# Patient Record
Sex: FEMALE | Employment: OTHER | URBAN - METROPOLITAN AREA
[De-identification: names, ages, dates, MRNs, and addresses within clinical notes are randomized per-mention and may not be internally consistent; named-entity substitution may affect disease eponyms.]

---

## 2024-04-29 ENCOUNTER — OFFICE VISIT (OUTPATIENT)
Dept: FAMILY MEDICINE CLINIC | Age: 74
End: 2024-04-29

## 2024-04-29 VITALS
SYSTOLIC BLOOD PRESSURE: 122 MMHG | BODY MASS INDEX: 32.17 KG/M2 | WEIGHT: 188.4 LBS | OXYGEN SATURATION: 94 % | HEIGHT: 64 IN | HEART RATE: 93 BPM | DIASTOLIC BLOOD PRESSURE: 78 MMHG | TEMPERATURE: 97.9 F

## 2024-04-29 DIAGNOSIS — G47.9 SLEEPING DIFFICULTY: ICD-10-CM

## 2024-04-29 DIAGNOSIS — Z23 NEED FOR SHINGLES VACCINE: ICD-10-CM

## 2024-04-29 DIAGNOSIS — Z91.81 AT HIGH RISK FOR FALLS: ICD-10-CM

## 2024-04-29 DIAGNOSIS — F41.1 GENERALIZED ANXIETY DISORDER: ICD-10-CM

## 2024-04-29 DIAGNOSIS — I87.2 EDEMA OF BOTH LOWER EXTREMITIES DUE TO PERIPHERAL VENOUS INSUFFICIENCY: ICD-10-CM

## 2024-04-29 DIAGNOSIS — Z12.31 ENCOUNTER FOR SCREENING MAMMOGRAM FOR MALIGNANT NEOPLASM OF BREAST: ICD-10-CM

## 2024-04-29 DIAGNOSIS — Z13.0 SCREENING, ANEMIA, DEFICIENCY, IRON: ICD-10-CM

## 2024-04-29 DIAGNOSIS — E55.9 VITAMIN D DEFICIENCY: ICD-10-CM

## 2024-04-29 DIAGNOSIS — Z87.310 HISTORY OF HEALED OSTEOPOROSIS FRACTURE: ICD-10-CM

## 2024-04-29 DIAGNOSIS — F33.1 MODERATE EPISODE OF RECURRENT MAJOR DEPRESSIVE DISORDER (HCC): ICD-10-CM

## 2024-04-29 DIAGNOSIS — K21.9 GASTROESOPHAGEAL REFLUX DISEASE, UNSPECIFIED WHETHER ESOPHAGITIS PRESENT: ICD-10-CM

## 2024-04-29 DIAGNOSIS — K22.70 BARRETT'S ESOPHAGUS WITHOUT DYSPLASIA: ICD-10-CM

## 2024-04-29 DIAGNOSIS — Z12.11 COLON CANCER SCREENING: ICD-10-CM

## 2024-04-29 DIAGNOSIS — E78.00 HYPERCHOLESTEREMIA: ICD-10-CM

## 2024-04-29 DIAGNOSIS — G43.809 OTHER MIGRAINE WITHOUT STATUS MIGRAINOSUS, NOT INTRACTABLE: ICD-10-CM

## 2024-04-29 DIAGNOSIS — Z13.220 SCREENING, LIPID: ICD-10-CM

## 2024-04-29 DIAGNOSIS — Z13.29 SCREENING FOR THYROID DISORDER: ICD-10-CM

## 2024-04-29 DIAGNOSIS — G60.0 CHARCOT-MARIE-TOOTH DISEASE: ICD-10-CM

## 2024-04-29 DIAGNOSIS — I10 PRIMARY HYPERTENSION: Primary | ICD-10-CM

## 2024-04-29 DIAGNOSIS — M81.0 AGE-RELATED OSTEOPOROSIS WITHOUT CURRENT PATHOLOGICAL FRACTURE: ICD-10-CM

## 2024-04-29 PROBLEM — G43.909 MIGRAINE: Status: ACTIVE | Noted: 2024-04-29

## 2024-04-29 RX ORDER — AMLODIPINE BESYLATE 5 MG/1
5 TABLET ORAL DAILY
COMMUNITY

## 2024-04-29 RX ORDER — AMLODIPINE BESYLATE 5 MG/1
5 TABLET ORAL DAILY
Qty: 90 TABLET | Refills: 1 | Status: CANCELLED | OUTPATIENT
Start: 2024-04-29

## 2024-04-29 RX ORDER — ACETAMINOPHEN 500 MG
1000 TABLET ORAL EVERY 8 HOURS
COMMUNITY
Start: 2022-04-28

## 2024-04-29 RX ORDER — SERTRALINE HYDROCHLORIDE 100 MG/1
100 TABLET, FILM COATED ORAL DAILY
COMMUNITY
End: 2024-04-29 | Stop reason: SDUPTHER

## 2024-04-29 RX ORDER — RIMEGEPANT SULFATE 75 MG/75MG
75 TABLET, ORALLY DISINTEGRATING ORAL DAILY PRN
Qty: 10 TABLET | Refills: 3 | Status: SHIPPED | OUTPATIENT
Start: 2024-04-29

## 2024-04-29 RX ORDER — METOPROLOL TARTRATE 50 MG/1
50 TABLET, FILM COATED ORAL 2 TIMES DAILY
COMMUNITY

## 2024-04-29 RX ORDER — LANOLIN ALCOHOL/MO/W.PET/CERES
1000 CREAM (GRAM) TOPICAL DAILY
COMMUNITY

## 2024-04-29 RX ORDER — SERTRALINE HYDROCHLORIDE 100 MG/1
100 TABLET, FILM COATED ORAL DAILY
Qty: 90 TABLET | Refills: 1 | Status: SHIPPED | OUTPATIENT
Start: 2024-04-29

## 2024-04-29 RX ORDER — AMITRIPTYLINE HYDROCHLORIDE 25 MG/1
50 TABLET, FILM COATED ORAL NIGHTLY
COMMUNITY

## 2024-04-29 RX ORDER — SUMATRIPTAN 50 MG/1
50 TABLET, FILM COATED ORAL
COMMUNITY
Start: 2018-09-28

## 2024-04-29 RX ORDER — OMEPRAZOLE 10 MG/1
40 CAPSULE, DELAYED RELEASE ORAL DAILY
COMMUNITY
End: 2024-04-29 | Stop reason: SDUPTHER

## 2024-04-29 RX ORDER — ROSUVASTATIN CALCIUM 5 MG/1
5 TABLET, COATED ORAL DAILY
Qty: 90 TABLET | Refills: 1 | Status: CANCELLED | OUTPATIENT
Start: 2024-04-29

## 2024-04-29 RX ORDER — BUTALBITAL, ACETAMINOPHEN, CAFFEINE AND CODEINE PHOSPHATE 50; 325; 40; 30 MG/1; MG/1; MG/1; MG/1
1 CAPSULE ORAL EVERY 6 HOURS PRN
COMMUNITY

## 2024-04-29 RX ORDER — METOPROLOL TARTRATE 50 MG/1
50 TABLET, FILM COATED ORAL 2 TIMES DAILY
Qty: 180 TABLET | Refills: 1 | Status: CANCELLED | OUTPATIENT
Start: 2024-04-29

## 2024-04-29 RX ORDER — OMEPRAZOLE 40 MG/1
40 CAPSULE, DELAYED RELEASE ORAL DAILY
Qty: 90 CAPSULE | Refills: 1 | Status: SHIPPED | OUTPATIENT
Start: 2024-04-29

## 2024-04-29 RX ORDER — BUTALBITAL, ACETAMINOPHEN AND CAFFEINE 50; 325; 40 MG/1; MG/1; MG/1
1 TABLET ORAL EVERY 4 HOURS PRN
Qty: 30 TABLET | Refills: 1 | Status: SHIPPED | OUTPATIENT
Start: 2024-04-29

## 2024-04-29 RX ORDER — ZOSTER VACCINE RECOMBINANT, ADJUVANTED 50 MCG/0.5
0.5 KIT INTRAMUSCULAR SEE ADMIN INSTRUCTIONS
Qty: 0.5 ML | Refills: 0 | Status: SHIPPED | OUTPATIENT
Start: 2024-04-29 | End: 2024-04-30

## 2024-04-29 RX ORDER — ROSUVASTATIN CALCIUM 5 MG/1
5 TABLET, COATED ORAL DAILY
COMMUNITY

## 2024-04-29 RX ORDER — CETIRIZINE HYDROCHLORIDE 10 MG/1
10 TABLET ORAL DAILY PRN
COMMUNITY

## 2024-04-29 RX ORDER — ACETAMINOPHEN 160 MG
2000 TABLET,DISINTEGRATING ORAL DAILY
COMMUNITY

## 2024-04-29 RX ORDER — BUTALBITAL, ACETAMINOPHEN, CAFFEINE AND CODEINE PHOSPHATE 50; 325; 40; 30 MG/1; MG/1; MG/1; MG/1
1 CAPSULE ORAL EVERY 6 HOURS PRN
Status: CANCELLED | OUTPATIENT
Start: 2024-04-29

## 2024-04-29 SDOH — ECONOMIC STABILITY: FOOD INSECURITY: WITHIN THE PAST 12 MONTHS, YOU WORRIED THAT YOUR FOOD WOULD RUN OUT BEFORE YOU GOT MONEY TO BUY MORE.: NEVER TRUE

## 2024-04-29 SDOH — ECONOMIC STABILITY: INCOME INSECURITY: HOW HARD IS IT FOR YOU TO PAY FOR THE VERY BASICS LIKE FOOD, HOUSING, MEDICAL CARE, AND HEATING?: NOT VERY HARD

## 2024-04-29 SDOH — ECONOMIC STABILITY: FOOD INSECURITY: WITHIN THE PAST 12 MONTHS, THE FOOD YOU BOUGHT JUST DIDN'T LAST AND YOU DIDN'T HAVE MONEY TO GET MORE.: NEVER TRUE

## 2024-04-29 SDOH — ECONOMIC STABILITY: HOUSING INSECURITY
IN THE LAST 12 MONTHS, WAS THERE A TIME WHEN YOU DID NOT HAVE A STEADY PLACE TO SLEEP OR SLEPT IN A SHELTER (INCLUDING NOW)?: NO

## 2024-04-29 ASSESSMENT — PATIENT HEALTH QUESTIONNAIRE - PHQ9
SUM OF ALL RESPONSES TO PHQ QUESTIONS 1-9: 1
SUM OF ALL RESPONSES TO PHQ QUESTIONS 1-9: 1
SUM OF ALL RESPONSES TO PHQ9 QUESTIONS 1 & 2: 1
2. FEELING DOWN, DEPRESSED OR HOPELESS: SEVERAL DAYS
1. LITTLE INTEREST OR PLEASURE IN DOING THINGS: NOT AT ALL
SUM OF ALL RESPONSES TO PHQ QUESTIONS 1-9: 1
SUM OF ALL RESPONSES TO PHQ QUESTIONS 1-9: 1

## 2024-04-29 NOTE — PATIENT INSTRUCTIONS
Your labs have been ordered today as fasting labs - this means you will need to fast overnight for at least 8 hours before you come in to get the blood drawn. You may have water, black tea or coffee without sugar or creamer prior to coming in for labs.   Labs due 5/9/24 or later   Your lab results will be released to your [employer] account as they are resulted by the lab. I will send you a message regarding your results once I have had a chance to review them, usually within a couple of days, so if you have not heard from me it means I'm either waiting for some results, or that I have not had a moment to review the results.

## 2024-04-29 NOTE — PROGRESS NOTES
[facility]  [facility]  [address] [place] [id number]  Dept: [phone]  Dept Fax: [phone]      Deandra Lou is a 73 y.o. female who presents today for hermedical conditions/complaints as noted below.  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 Primary hypertension  -     Comprehensive Metabolic Panel; Future  2. Hypercholesteremia  -     Comprehensive Metabolic Panel; Future  -     Lipid, Fasting; Future  3. Generalized anxiety disorder  -     sertraline (ZOLOFT) 100 MG tablet; Take 1 tablet by mouth daily, Disp-90 tablet, R-1Normal  4. Moderate episode of recurrent major depressive disorder (HCC)  -     sertraline (ZOLOFT) 100 MG tablet; Take 1 tablet by mouth daily, Disp-90 tablet, R-1Normal  5. Gastroesophageal reflux disease, unspecified whether esophagitis present  -     omeprazole (PRILOSEC) 40 MG delayed release capsule; Take 1 capsule by mouth daily, Disp-90 capsule, R-1Normal  6. Other migraine without status migrainosus, not intractable  -     rimegepant sulfate (NURTEC) 75 MG TBDP; Take 75 mg by mouth daily as needed (migraine), Disp-10 tablet, R-3Normal  -     butalbital-acetaminophen-caffeine (FIORICET, ESGIC) -40 MG per tablet; Take 1 tablet by mouth every 4 hours as needed for Migraine, Disp-30 tablet, R-1Normal  7. Sleeping difficulty  8. Encounter for screening mammogram for malignant neoplasm of breast  -     PAT DIGITAL SCREEN W OR WO CAD BILATERAL; Future  9. Need for shingles vaccine  -     zoster recombinant adjuvanted vaccine (SHINGRIX) 50 MCG/0.5ML SUSR injection; Inject 0.5 mLs into the muscle See Admin Instructions for 1 day, Disp-0.5 mL, R-0Print  10. Colon cancer

## 2024-05-07 ENCOUNTER — TELEPHONE (OUTPATIENT)
Dept: NEUROLOGY | Age: 74
End: 2024-05-07

## 2024-05-07 NOTE — TELEPHONE ENCOUNTER
05 07 2024 called the patient times 2 (04 30 2024 and 05 07 2024 at  [phone]) to schedule new patient appointment with one of our providers, voicemail was full both times, no response.  I mailed the patient a letter asking them to call the office back to schedule this appointment.  KS

## 2024-05-14 ENCOUNTER — TELEPHONE (OUTPATIENT)
Dept: FAMILY MEDICINE CLINIC | Age: 74
End: 2024-05-14

## 2024-05-14 NOTE — TELEPHONE ENCOUNTER
----- Message from Dede Sebastian Jalenradharenata sent at 5/14/2024  2:38 PM EDT -----  Regarding: ECC Message to Provider  ECC Message to Provider    Relationship to Patient: Self     Additional Information patient received an order for her colonoscopy and pt wants to provide the FAX number: [phone]  --------------------------------------------------------------------------------------------------------------------------    Call Back Information: OK to leave message on voicemail  Preferred Call Back Number: Phone [phone]

## 2024-05-28 ENCOUNTER — HOSPITAL ENCOUNTER (OUTPATIENT)
Dept: MAMMOGRAPHY | Age: 74
Discharge: HOME | End: 2024-05-30
Payer: COMMERCIAL

## 2024-05-28 VITALS — WEIGHT: 188 LBS | HEIGHT: 64 IN | BODY MASS INDEX: 32.1 KG/M2

## 2024-05-28 DIAGNOSIS — Z12.31 ENCOUNTER FOR SCREENING MAMMOGRAM FOR MALIGNANT NEOPLASM OF BREAST: ICD-10-CM

## 2024-05-28 PROCEDURE — 77063 BREAST TOMOSYNTHESIS BI: CPT

## 2024-06-10 ENCOUNTER — PATIENT MESSAGE (OUTPATIENT)
Dept: FAMILY MEDICINE CLINIC | Age: 74
End: 2024-06-10

## 2024-06-11 NOTE — TELEPHONE ENCOUNTER
From: Deandra Lou  To: Dr. Sunshine Perkins  Sent: 6/10/2024 5:04 PM EDT  Subject: Renewal of handicap sticker    Hi Dr. Gonsalez,    I need to get a renewal prescription for my handicap sticker that’s due in August 2024 to [facility].

## 2024-06-18 ENCOUNTER — HOSPITAL ENCOUNTER (OUTPATIENT)
Dept: ULTRASOUND IMAGING | Age: 74
Discharge: HOME | End: 2024-06-20
Payer: COMMERCIAL

## 2024-06-18 ENCOUNTER — HOSPITAL ENCOUNTER (OUTPATIENT)
Dept: MAMMOGRAPHY | Age: 74
Discharge: HOME | End: 2024-06-20
Payer: COMMERCIAL

## 2024-06-18 DIAGNOSIS — R92.8 ABNORMAL MAMMOGRAM: ICD-10-CM

## 2024-06-18 PROCEDURE — 76642 ULTRASOUND BREAST LIMITED: CPT

## 2024-06-18 PROCEDURE — G0279 TOMOSYNTHESIS, MAMMO: HCPCS

## 2024-07-02 ENCOUNTER — TRANSCRIBE ORDERS (OUTPATIENT)
Dept: ADMINISTRATIVE | Age: 74
End: 2024-07-02

## 2024-07-02 DIAGNOSIS — Z53.8 FAILED ATTEMPTED SURGICAL PROCEDURE: Primary | ICD-10-CM

## 2024-07-03 ENCOUNTER — HOSPITAL ENCOUNTER (OUTPATIENT)
Dept: GENERAL RADIOLOGY | Age: 74
Discharge: HOME | End: 2024-07-05
Attending: INTERNAL MEDICINE
Payer: COMMERCIAL

## 2024-07-03 DIAGNOSIS — Z53.8 FAILED ATTEMPTED SURGICAL PROCEDURE: ICD-10-CM

## 2024-07-03 PROCEDURE — 2500000003 HC RX 250 WO HCPCS: Performed by: INTERNAL MEDICINE

## 2024-07-03 PROCEDURE — 74280 X-RAY XM COLON 2CNTRST STD: CPT

## 2024-07-03 RX ADMIN — BARIUM SULFATE 745 ML: 1.05 SUSPENSION ORAL; RECTAL at 13:50

## 2024-07-30 RX ORDER — METOPROLOL TARTRATE 50 MG/1
50 TABLET, FILM COATED ORAL 2 TIMES DAILY
Qty: 60 TABLET | Refills: 1 | Status: SHIPPED | OUTPATIENT
Start: 2024-07-30

## 2024-09-13 DIAGNOSIS — G43.809 OTHER MIGRAINE WITHOUT STATUS MIGRAINOSUS, NOT INTRACTABLE: ICD-10-CM

## 2024-09-16 RX ORDER — BUTALBITAL, ACETAMINOPHEN AND CAFFEINE 50; 325; 40 MG/1; MG/1; MG/1
1 TABLET ORAL EVERY 4 HOURS PRN
Qty: 30 TABLET | Refills: 0 | Status: SHIPPED | OUTPATIENT
Start: 2024-09-16 | End: 2024-09-18 | Stop reason: SDUPTHER

## 2024-09-18 ENCOUNTER — OFFICE VISIT (OUTPATIENT)
Dept: FAMILY MEDICINE CLINIC | Age: 74
End: 2024-09-18
Payer: COMMERCIAL

## 2024-09-18 VITALS — BODY MASS INDEX: 32.79 KG/M2 | TEMPERATURE: 97.4 F | WEIGHT: 191 LBS

## 2024-09-18 DIAGNOSIS — G43.809 OTHER MIGRAINE WITHOUT STATUS MIGRAINOSUS, NOT INTRACTABLE: ICD-10-CM

## 2024-09-18 DIAGNOSIS — I10 PRIMARY HYPERTENSION: ICD-10-CM

## 2024-09-18 DIAGNOSIS — F33.1 MODERATE EPISODE OF RECURRENT MAJOR DEPRESSIVE DISORDER (HCC): ICD-10-CM

## 2024-09-18 DIAGNOSIS — K21.9 GASTROESOPHAGEAL REFLUX DISEASE, UNSPECIFIED WHETHER ESOPHAGITIS PRESENT: ICD-10-CM

## 2024-09-18 DIAGNOSIS — S89.92XA LEFT KNEE INJURY, INITIAL ENCOUNTER: Primary | ICD-10-CM

## 2024-09-18 DIAGNOSIS — F41.1 GENERALIZED ANXIETY DISORDER: ICD-10-CM

## 2024-09-18 PROCEDURE — 1123F ACP DISCUSS/DSCN MKR DOCD: CPT | Performed by: INTERNAL MEDICINE

## 2024-09-18 PROCEDURE — 99214 OFFICE O/P EST MOD 30 MIN: CPT | Performed by: INTERNAL MEDICINE

## 2024-09-18 RX ORDER — BUTALBITAL, ACETAMINOPHEN AND CAFFEINE 50; 325; 40 MG/1; MG/1; MG/1
1 TABLET ORAL EVERY 4 HOURS PRN
Qty: 60 TABLET | Refills: 1 | Status: SHIPPED | OUTPATIENT
Start: 2024-09-18

## 2024-09-18 RX ORDER — CELECOXIB 100 MG/1
100 CAPSULE ORAL DAILY
Qty: 30 CAPSULE | Refills: 3 | Status: SHIPPED | OUTPATIENT
Start: 2024-09-18

## 2024-09-23 ASSESSMENT — ENCOUNTER SYMPTOMS
COUGH: 0
ANAL BLEEDING: 0
CONSTIPATION: 0
BLOOD IN STOOL: 0
SHORTNESS OF BREATH: 0
DIARRHEA: 0
ABDOMINAL PAIN: 0
NAUSEA: 0
CHOKING: 0
WHEEZING: 0
CHEST TIGHTNESS: 0
VOMITING: 0

## 2024-09-23 ASSESSMENT — VISUAL ACUITY: OU: 1

## 2024-09-29 DIAGNOSIS — F41.1 GENERALIZED ANXIETY DISORDER: ICD-10-CM

## 2024-09-29 DIAGNOSIS — F33.1 MODERATE EPISODE OF RECURRENT MAJOR DEPRESSIVE DISORDER (HCC): ICD-10-CM

## 2024-09-29 DIAGNOSIS — K21.9 GASTROESOPHAGEAL REFLUX DISEASE, UNSPECIFIED WHETHER ESOPHAGITIS PRESENT: ICD-10-CM

## 2024-10-01 RX ORDER — METOPROLOL TARTRATE 50 MG
50 TABLET ORAL 2 TIMES DAILY
Qty: 120 TABLET | Refills: 0 | Status: SHIPPED | OUTPATIENT
Start: 2024-10-01

## 2024-10-01 RX ORDER — SERTRALINE HYDROCHLORIDE 100 MG/1
100 TABLET, FILM COATED ORAL DAILY
Qty: 90 TABLET | Refills: 0 | Status: SHIPPED | OUTPATIENT
Start: 2024-10-01

## 2024-10-01 RX ORDER — OMEPRAZOLE 40 MG/1
40 CAPSULE, DELAYED RELEASE ORAL DAILY
Qty: 90 CAPSULE | Refills: 0 | Status: SHIPPED | OUTPATIENT
Start: 2024-10-01

## 2024-10-01 NOTE — TELEPHONE ENCOUNTER
Last visit: 09/18/24  Last Med refill:   Does patient have enough medication for 72 hours: Yes    Next Visit Date:  Future Appointments   Date Time Provider Department Center   10/7/2024  1:00 PM Latia Desouza PA [facility] -       Health Maintenance   Topic Date Due    Lipids  Never done    Hepatitis C screen  Never done    DTaP/Tdap/Td vaccine (1 - Tdap) Never done    Shingles vaccine (1 of 2) Never done    DEXA (modify frequency per FRAX score)  Never done    Pneumococcal 65+ years Vaccine (1 of 1 - PCV) Never done    Flu vaccine (1) 08/01/2024    COVID-19 Vaccine (6 - 2023-24 season) 09/01/2024    Depression Monitoring  04/29/2025    Breast cancer screen  06/18/2025    Colorectal Cancer Screen  07/02/2034    Respiratory Syncytial Virus (RSV) Pregnant or age 60 yrs+  Completed    Hepatitis A vaccine  Aged Out    Hepatitis B vaccine  Aged Out    Hib vaccine  Aged Out    Polio vaccine  Aged Out    Meningococcal (ACWY) vaccine  Aged Out    Depression Screen  Discontinued       No results found for: \"LABA1C\"          ( goal A1C is < 7)   No components found for: \"LABMICR\"  No components found for: \"LDLCHOLESTEROL\", \"LDLCALC\"    (goal LDL is <100)   No results found for: \"AST\", \"ALT\", \"BUN\", \"CR\"  BP Readings from Last 3 Encounters:   04/29/24 122/78          (goal 120/80)    All Future Testing planned in [facility]  Lab Frequency Next Occurrence   COLONOSCOPY (Screening) Once 04/29/2024   CBC with Auto Differential Once 04/29/2024   Comprehensive Metabolic Panel Once 04/29/2024   Lipid, Fasting Once 04/29/2024   TSH with Reflex Once 04/29/2024   Vitamin D 25 Hydroxy Once 04/29/2024               Patient Active Problem List:     Primary hypertension     Hypercholesteremia     Generalized anxiety disorder     Moderate episode of recurrent major depressive disorder (HCC)     Gastroesophageal reflux disease     Migraine     Sleeping difficulty

## 2024-10-10 ENCOUNTER — OFFICE VISIT (OUTPATIENT)
Dept: NEUROLOGY | Age: 74
End: 2024-10-10
Payer: COMMERCIAL

## 2024-10-10 VITALS
HEART RATE: 66 BPM | WEIGHT: 191 LBS | DIASTOLIC BLOOD PRESSURE: 85 MMHG | HEIGHT: 64 IN | BODY MASS INDEX: 32.61 KG/M2 | SYSTOLIC BLOOD PRESSURE: 134 MMHG

## 2024-10-10 DIAGNOSIS — G60.0 CMT (CHARCOT-MARIE-TOOTH DISEASE): Primary | ICD-10-CM

## 2024-10-10 DIAGNOSIS — R26.9 GAIT DISTURBANCE: ICD-10-CM

## 2024-10-10 DIAGNOSIS — G43.009 MIGRAINE WITHOUT AURA AND WITHOUT STATUS MIGRAINOSUS, NOT INTRACTABLE: ICD-10-CM

## 2024-10-10 PROCEDURE — 3075F SYST BP GE 130 - 139MM HG: CPT | Performed by: PHYSICIAN ASSISTANT

## 2024-10-10 PROCEDURE — 3079F DIAST BP 80-89 MM HG: CPT | Performed by: PHYSICIAN ASSISTANT

## 2024-10-10 PROCEDURE — 99204 OFFICE O/P NEW MOD 45 MIN: CPT | Performed by: PHYSICIAN ASSISTANT

## 2024-10-10 PROCEDURE — 1123F ACP DISCUSS/DSCN MKR DOCD: CPT | Performed by: PHYSICIAN ASSISTANT

## 2024-10-10 NOTE — PROGRESS NOTES
[address]  [facility] [address]  Dept: [phone]    PATIENT NAME: Deandra Lou  PATIENT MRN: [id number]  PRIMARY CARE PHYSICIAN: Sunshine Perkins MD    HPI:      Deandra Lou is a 74 y.o. female who presents to clinic today for evaluation of neuropathy. She has a strong family history of CMT and she reports she had confirmatory genetic testing. Previously followed with Dr. Everett Davidson.    She receives Amitriptyline 25 mg qhs for neuropathic pain. She feels that it is adequate at this dose. It is also helpful as a sleep aid.     Today she reports that she feels her symptoms of extremity numbness, paresthesias, and weakness seem to be gradually worsening. She ambulates with a cane for balance. Her legs will ache, feel heave, with occasional burning sensation. It is worse after an active day, especially at bed time. There is significant paresthesias in bilateral hands. She has had some numbness in the ulnar distribution on the left. She will drop objects. She is unable to walk the length of her street due to weakness: \"my legs give out.\" She has not had any falls, but takes special precaution.     She wears bilateral carpal tunnel splints at night with good response for hand paresthesias. No tremors.     She does admit to migraines which have decreased in frequency since menopause. Seasonal changes are a trigger. They have been lifelong.    Headache description:  Location: occipital  Character: tight  Duration: hours, due to rescue  Associated symptoms: visual aura, blurred vision, photophobia, phonophobia, nausea, vomiting   Frequency: 2 per month  Rescue: Nurtec, Fioricet very effective         PREVIOUS WORKUP:     Past Medical History:   Diagnosis Date    Headache     Sinus headaches always,  migraines after first pregnancy    Hypertension     Around my 50 to 61 age    Migraine     Neurocutaneous syndrome (HCC)     CMT    Neuropathy     CMT related    Sleep difficulties     from

## 2024-11-25 RX ORDER — METOPROLOL TARTRATE 50 MG
50 TABLET ORAL 2 TIMES DAILY
Qty: 120 TABLET | Refills: 0 | Status: SHIPPED | OUTPATIENT
Start: 2024-11-25

## 2024-11-25 NOTE — TELEPHONE ENCOUNTER
Last visit: 09/18/2024  Last Med refill: 10/26/2024  Does patient have enough medication for 72 hours: No:     Next Visit Date:  No future appointments.    Health Maintenance   Topic Date Due    Lipids  Never done    Hepatitis C screen  Never done    DTaP/Tdap/Td vaccine (1 - Tdap) Never done    Shingles vaccine (1 of 2) Never done    DEXA (modify frequency per FRAX score)  Never done    Pneumococcal 65+ years Vaccine (1 of 1 - PCV) Never done    Depression Monitoring  04/29/2025    Breast cancer screen  06/18/2025    Colorectal Cancer Screen  07/02/2034    Flu vaccine  Completed    COVID-19 Vaccine  Completed    Respiratory Syncytial Virus (RSV) Pregnant or age 60 yrs+  Completed    Hepatitis A vaccine  Aged Out    Hepatitis B vaccine  Aged Out    Hib vaccine  Aged Out    Polio vaccine  Aged Out    Meningococcal (ACWY) vaccine  Aged Out    Depression Screen  Discontinued       No results found for: \"LABA1C\"          ( goal A1C is < 7)   No components found for: \"LABMICR\"  No components found for: \"LDLCHOLESTEROL\", \"LDLCALC\"    (goal LDL is <100)   No results found for: \"AST\", \"ALT\", \"BUN\", \"CR\"  BP Readings from Last 3 Encounters:   10/10/24 134/85   04/29/24 122/78          (goal 120/80)    All Future Testing planned in [facility]  Lab Frequency Next Occurrence   COLONOSCOPY (Screening) Once 04/29/2024   CBC with Auto Differential Once 04/29/2024   Comprehensive Metabolic Panel Once 04/29/2024   Lipid, Fasting Once 04/29/2024   TSH with Reflex Once 04/29/2024   Vitamin D 25 Hydroxy Once 04/29/2024   Vitamin B12 & Folate Once 10/10/2024               Patient Active Problem List:     Primary hypertension     Hypercholesteremia     Generalized anxiety disorder     Moderate episode of recurrent major depressive disorder (HCC)     Gastroesophageal reflux disease     Migraine     Sleeping difficulty

## 2024-12-17 ENCOUNTER — OFFICE VISIT (OUTPATIENT)
Dept: FAMILY MEDICINE CLINIC | Age: 74
End: 2024-12-17
Payer: COMMERCIAL

## 2024-12-17 VITALS
OXYGEN SATURATION: 91 % | SYSTOLIC BLOOD PRESSURE: 132 MMHG | BODY MASS INDEX: 32.79 KG/M2 | WEIGHT: 191 LBS | HEART RATE: 68 BPM | DIASTOLIC BLOOD PRESSURE: 80 MMHG | TEMPERATURE: 97.5 F

## 2024-12-17 DIAGNOSIS — L30.1 DYSHIDROTIC ECZEMA: Primary | ICD-10-CM

## 2024-12-17 PROCEDURE — 3075F SYST BP GE 130 - 139MM HG: CPT | Performed by: STUDENT IN AN ORGANIZED HEALTH CARE EDUCATION/TRAINING PROGRAM

## 2024-12-17 PROCEDURE — 1123F ACP DISCUSS/DSCN MKR DOCD: CPT | Performed by: STUDENT IN AN ORGANIZED HEALTH CARE EDUCATION/TRAINING PROGRAM

## 2024-12-17 PROCEDURE — 99213 OFFICE O/P EST LOW 20 MIN: CPT | Performed by: STUDENT IN AN ORGANIZED HEALTH CARE EDUCATION/TRAINING PROGRAM

## 2024-12-17 PROCEDURE — 3079F DIAST BP 80-89 MM HG: CPT | Performed by: STUDENT IN AN ORGANIZED HEALTH CARE EDUCATION/TRAINING PROGRAM

## 2024-12-17 RX ORDER — BENZOCAINE/MENTHOL 6 MG-10 MG
LOZENGE MUCOUS MEMBRANE
Qty: 30 G | Refills: 1 | Status: SHIPPED | OUTPATIENT
Start: 2024-12-17 | End: 2024-12-24

## 2024-12-17 NOTE — PROGRESS NOTES
Deandra Lou (:  1950) is a 74 y.o. female,Established patient, here for evaluation of the following chief complaint(s):  Skin Problem (Pt. States both hands have a burning and skin is peeling off  )         Assessment & Plan  Dyshidrotic eczema   Noted on bilateral hands and fingers with peeling and dryness, non tender but with pruritus, worse with winter months, ongoing for past few months   Discussed using non fragrance soaps, discussed moisturizing often, especially after taking showers, will prescribe steroid cream to help with uncontrolled breakouts  If continues to be uncontrolled to follow back up            Return if symptoms worsen or fail to improve.       Subjective   74 yr old here due to concerns of a skin rash, per patient started a few months ago with dryness and peeling and sometimes itching, can become more irritated with the cold weather, has not tried anything for the skin irritation, has not had any new irritant to the skin.           Review of Systems   Skin:  Positive for rash.        Skin rash on fingers and palm of hands    All other systems reviewed and are negative.         Objective   Physical Exam  Vitals reviewed.   Constitutional:       Appearance: Normal appearance.   HENT:      Head: Normocephalic and atraumatic.   Skin:     General: Skin is warm.      Capillary Refill: Capillary refill takes less than 2 seconds.      Findings: Rash (dry, peeling skin around bilateral fingers and palms of hand) present.   Neurological:      Mental Status: She is alert.                  An electronic signature was used to authenticate this note.    --Mychal Davis MD

## 2024-12-26 DIAGNOSIS — G43.809 OTHER MIGRAINE WITHOUT STATUS MIGRAINOSUS, NOT INTRACTABLE: ICD-10-CM

## 2024-12-27 NOTE — TELEPHONE ENCOUNTER
Last visit: 12/18/2024  Last Med refill: 09/18/2024  Does patient have enough medication for 72 hours: No:     Next Visit Date:  No future appointments.    Health Maintenance   Topic Date Due    Lipids  Never done    Hepatitis C screen  Never done    DTaP/Tdap/Td vaccine (1 - Tdap) Never done    Shingles vaccine (1 of 2) Never done    DEXA (modify frequency per FRAX score)  Never done    Pneumococcal 65+ years Vaccine (1 of 1 - PCV) Never done    Depression Monitoring  04/29/2025    Breast cancer screen  06/18/2025    Colorectal Cancer Screen  07/02/2034    Flu vaccine  Completed    COVID-19 Vaccine  Completed    Respiratory Syncytial Virus (RSV) Pregnant or age 60 yrs+  Completed    Hepatitis A vaccine  Aged Out    Hepatitis B vaccine  Aged Out    Hib vaccine  Aged Out    Polio vaccine  Aged Out    Meningococcal (ACWY) vaccine  Aged Out    Depression Screen  Discontinued       No results found for: \"LABA1C\"          ( goal A1C is < 7)   No components found for: \"LABMICR\"  No components found for: \"LDLCHOLESTEROL\", \"LDLCALC\"    (goal LDL is <100)   No results found for: \"AST\", \"ALT\", \"BUN\", \"CR\"  BP Readings from Last 3 Encounters:   12/17/24 132/80   10/10/24 134/85   04/29/24 122/78          (goal 120/80)    All Future Testing planned in [facility]  Lab Frequency Next Occurrence   COLONOSCOPY (Screening) Once 04/29/2024   CBC with Auto Differential Once 04/29/2024   Comprehensive Metabolic Panel Once 04/29/2024   Lipid, Fasting Once 04/29/2024   TSH with Reflex Once 04/29/2024   Vitamin D 25 Hydroxy Once 04/29/2024   Vitamin B12 & Folate Once 10/10/2024               Patient Active Problem List:     Primary hypertension     Hypercholesteremia     Generalized anxiety disorder     Moderate episode of recurrent major depressive disorder (HCC)     Gastroesophageal reflux disease     Migraine     Sleeping difficulty

## 2024-12-31 DIAGNOSIS — G43.809 OTHER MIGRAINE WITHOUT STATUS MIGRAINOSUS, NOT INTRACTABLE: ICD-10-CM

## 2024-12-31 DIAGNOSIS — F41.1 GENERALIZED ANXIETY DISORDER: ICD-10-CM

## 2024-12-31 DIAGNOSIS — F33.1 MODERATE EPISODE OF RECURRENT MAJOR DEPRESSIVE DISORDER (HCC): ICD-10-CM

## 2024-12-31 DIAGNOSIS — K21.9 GASTROESOPHAGEAL REFLUX DISEASE, UNSPECIFIED WHETHER ESOPHAGITIS PRESENT: ICD-10-CM

## 2024-12-31 RX ORDER — SERTRALINE HYDROCHLORIDE 100 MG/1
100 TABLET, FILM COATED ORAL DAILY
Qty: 90 TABLET | Refills: 1 | Status: SHIPPED | OUTPATIENT
Start: 2024-12-31

## 2024-12-31 RX ORDER — OMEPRAZOLE 40 MG/1
40 CAPSULE, DELAYED RELEASE ORAL DAILY
Qty: 90 CAPSULE | Refills: 1 | Status: SHIPPED | OUTPATIENT
Start: 2024-12-31

## 2024-12-31 RX ORDER — BUTALBITAL, ACETAMINOPHEN AND CAFFEINE 50; 325; 40 MG/1; MG/1; MG/1
1 TABLET ORAL EVERY 4 HOURS PRN
Qty: 60 TABLET | Refills: 1 | OUTPATIENT
Start: 2024-12-31

## 2024-12-31 RX ORDER — BUTALBITAL, ACETAMINOPHEN AND CAFFEINE 50; 325; 40 MG/1; MG/1; MG/1
1 TABLET ORAL EVERY 4 HOURS PRN
Qty: 60 TABLET | Refills: 1 | Status: SHIPPED | OUTPATIENT
Start: 2024-12-31

## 2024-12-31 RX ORDER — METOPROLOL TARTRATE 50 MG
50 TABLET ORAL 2 TIMES DAILY
Qty: 180 TABLET | Refills: 1 | Status: SHIPPED | OUTPATIENT
Start: 2024-12-31

## 2024-12-31 NOTE — TELEPHONE ENCOUNTER
Last visit: 12/17/24  w/ Dr Davis  09/18/24  w/ Dr Perkins  Last Med refill: 11/25/24 & 10/01/24  Does patient have enough medication for 72 hours: Yes    Next Visit Date:  No future appointments.    Health Maintenance   Topic Date Due    Lipids  Never done    Hepatitis C screen  Never done    DTaP/Tdap/Td vaccine (1 - Tdap) Never done    Shingles vaccine (1 of 2) Never done    DEXA (modify frequency per FRAX score)  Never done    Pneumococcal 65+ years Vaccine (1 of 1 - PCV) Never done    Depression Monitoring  04/29/2025    Breast cancer screen  06/18/2025    Colorectal Cancer Screen  07/02/2034    Flu vaccine  Completed    COVID-19 Vaccine  Completed    Respiratory Syncytial Virus (RSV) Pregnant or age 60 yrs+  Completed    Hepatitis A vaccine  Aged Out    Hepatitis B vaccine  Aged Out    Hib vaccine  Aged Out    Polio vaccine  Aged Out    Meningococcal (ACWY) vaccine  Aged Out    Depression Screen  Discontinued       No results found for: \"LABA1C\"          ( goal A1C is < 7)   No components found for: \"LABMICR\"  No components found for: \"LDLCHOLESTEROL\", \"LDLCALC\"    (goal LDL is <100)   No results found for: \"AST\", \"ALT\", \"BUN\", \"CR\"  BP Readings from Last 3 Encounters:   12/17/24 132/80   10/10/24 134/85   04/29/24 122/78          (goal 120/80)    All Future Testing planned in [facility]  Lab Frequency Next Occurrence   COLONOSCOPY (Screening) Once 04/29/2024   CBC with Auto Differential Once 04/29/2024   Comprehensive Metabolic Panel Once 04/29/2024   Lipid, Fasting Once 04/29/2024   TSH with Reflex Once 04/29/2024   Vitamin D 25 Hydroxy Once 04/29/2024   Vitamin B12 & Folate Once 10/10/2024               Patient Active Problem List:     Primary hypertension     Hypercholesteremia     Generalized anxiety disorder     Moderate episode of recurrent major depressive disorder (HCC)     Gastroesophageal reflux disease     Migraine     Sleeping difficulty

## 2024-12-31 NOTE — TELEPHONE ENCOUNTER
Last visit: 12/17/24  w/ Dr Davis  09/18/24  w/ Dr Perkins  Last Med refill: 10/01/24 and unknown  Does patient have enough medication for 72 hours: Yes

## 2025-01-03 ENCOUNTER — PATIENT MESSAGE (OUTPATIENT)
Dept: FAMILY MEDICINE CLINIC | Age: 75
End: 2025-01-03

## 2025-01-06 RX ORDER — ROSUVASTATIN CALCIUM 5 MG/1
5 TABLET, COATED ORAL DAILY
Qty: 30 TABLET | Refills: 5 | Status: SHIPPED | OUTPATIENT
Start: 2025-01-06

## 2025-01-06 NOTE — TELEPHONE ENCOUNTER
Last visit: 12/17/2024  Last Med refill: historical provider  Does patient have enough medication for 72 hours: No:     Next Visit Date:  No future appointments.    Health Maintenance   Topic Date Due    Lipids  Never done    Hepatitis C screen  Never done    DTaP/Tdap/Td vaccine (1 - Tdap) Never done    Shingles vaccine (1 of 2) Never done    DEXA (modify frequency per FRAX score)  Never done    Pneumococcal 65+ years Vaccine (1 of 1 - PCV) Never done    Depression Monitoring  04/29/2025    Breast cancer screen  06/18/2025    Colorectal Cancer Screen  07/02/2034    Flu vaccine  Completed    COVID-19 Vaccine  Completed    Respiratory Syncytial Virus (RSV) Pregnant or age 60 yrs+  Completed    Hepatitis A vaccine  Aged Out    Hepatitis B vaccine  Aged Out    Hib vaccine  Aged Out    Polio vaccine  Aged Out    Meningococcal (ACWY) vaccine  Aged Out    Depression Screen  Discontinued       No results found for: \"LABA1C\"          ( goal A1C is < 7)   No components found for: \"LABMICR\"  No components found for: \"LDLCHOLESTEROL\", \"LDLCALC\"    (goal LDL is <100)   No results found for: \"AST\", \"ALT\", \"BUN\", \"CR\"  BP Readings from Last 3 Encounters:   12/17/24 132/80   10/10/24 134/85   04/29/24 122/78          (goal 120/80)    All Future Testing planned in [facility]  Lab Frequency Next Occurrence   COLONOSCOPY (Screening) Once 04/29/2024   CBC with Auto Differential Once 04/29/2024   Comprehensive Metabolic Panel Once 04/29/2024   Lipid, Fasting Once 04/29/2024   TSH with Reflex Once 04/29/2024   Vitamin D 25 Hydroxy Once 04/29/2024   Vitamin B12 & Folate Once 10/10/2024               Patient Active Problem List:     Primary hypertension     Hypercholesteremia     Generalized anxiety disorder     Moderate episode of recurrent major depressive disorder (HCC)     Gastroesophageal reflux disease     Migraine     Sleeping difficulty

## 2025-01-19 DIAGNOSIS — S89.92XA LEFT KNEE INJURY, INITIAL ENCOUNTER: ICD-10-CM

## 2025-01-19 DIAGNOSIS — G43.809 OTHER MIGRAINE WITHOUT STATUS MIGRAINOSUS, NOT INTRACTABLE: ICD-10-CM

## 2025-01-20 RX ORDER — CELECOXIB 100 MG/1
100 CAPSULE ORAL DAILY
Qty: 30 CAPSULE | Refills: 3 | Status: SHIPPED | OUTPATIENT
Start: 2025-01-20

## 2025-01-20 RX ORDER — RIMEGEPANT SULFATE 75 MG/75MG
75 TABLET, ORALLY DISINTEGRATING ORAL DAILY PRN
Qty: 10 TABLET | Refills: 3 | Status: SHIPPED | OUTPATIENT
Start: 2025-01-20

## 2025-01-20 NOTE — TELEPHONE ENCOUNTER
Last visit: 12/17/2024  Last Med refill: 09/18/2024  Does patient have enough medication for 72 hours: No    Next Visit Date:  No future appointments.    Health Maintenance   Topic Date Due    Lipids  Never done    Hepatitis C screen  Never done    DTaP/Tdap/Td vaccine (1 - Tdap) Never done    Shingles vaccine (1 of 2) Never done    DEXA (modify frequency per FRAX score)  Never done    Pneumococcal 65+ years Vaccine (1 of 1 - PCV) Never done    Depression Monitoring  04/29/2025    Breast cancer screen  06/18/2025    Colorectal Cancer Screen  07/02/2034    Flu vaccine  Completed    COVID-19 Vaccine  Completed    Respiratory Syncytial Virus (RSV) Pregnant or age 60 yrs+  Completed    Hepatitis A vaccine  Aged Out    Hepatitis B vaccine  Aged Out    Hib vaccine  Aged Out    Polio vaccine  Aged Out    Meningococcal (ACWY) vaccine  Aged Out    Depression Screen  Discontinued       No results found for: \"LABA1C\"          ( goal A1C is < 7)   No components found for: \"LABMICR\"  No components found for: \"LDLCHOLESTEROL\", \"LDLCALC\"    (goal LDL is <100)   No results found for: \"AST\", \"ALT\", \"BUN\", \"CR\"  BP Readings from Last 3 Encounters:   12/17/24 132/80   10/10/24 134/85   04/29/24 122/78          (goal 120/80)    All Future Testing planned in [facility]  Lab Frequency Next Occurrence   COLONOSCOPY (Screening) Once 04/29/2024   CBC with Auto Differential Once 04/29/2024   Comprehensive Metabolic Panel Once 04/29/2024   Lipid, Fasting Once 04/29/2024   TSH with Reflex Once 04/29/2024   Vitamin D 25 Hydroxy Once 04/29/2024   Vitamin B12 & Folate Once 10/10/2024               Patient Active Problem List:     Primary hypertension     Hypercholesteremia     Generalized anxiety disorder     Moderate episode of recurrent major depressive disorder (HCC)     Gastroesophageal reflux disease     Migraine     Sleeping difficulty

## 2025-01-20 NOTE — TELEPHONE ENCOUNTER
Last visit: 12/17/2024  Last Med refill: 04/29/2024  Does patient have enough medication for 72 hours: No    Next Visit Date:  No future appointments.    Health Maintenance   Topic Date Due    Lipids  Never done    Hepatitis C screen  Never done    DTaP/Tdap/Td vaccine (1 - Tdap) Never done    Shingles vaccine (1 of 2) Never done    DEXA (modify frequency per FRAX score)  Never done    Pneumococcal 65+ years Vaccine (1 of 1 - PCV) Never done    Depression Monitoring  04/29/2025    Breast cancer screen  06/18/2025    Colorectal Cancer Screen  07/02/2034    Flu vaccine  Completed    COVID-19 Vaccine  Completed    Respiratory Syncytial Virus (RSV) Pregnant or age 60 yrs+  Completed    Hepatitis A vaccine  Aged Out    Hepatitis B vaccine  Aged Out    Hib vaccine  Aged Out    Polio vaccine  Aged Out    Meningococcal (ACWY) vaccine  Aged Out    Depression Screen  Discontinued       No results found for: \"LABA1C\"          ( goal A1C is < 7)   No components found for: \"LABMICR\"  No components found for: \"LDLCHOLESTEROL\", \"LDLCALC\"    (goal LDL is <100)   No results found for: \"AST\", \"ALT\", \"BUN\", \"CR\"  BP Readings from Last 3 Encounters:   12/17/24 132/80   10/10/24 134/85   04/29/24 122/78          (goal 120/80)    All Future Testing planned in [facility]  Lab Frequency Next Occurrence   COLONOSCOPY (Screening) Once 04/29/2024   CBC with Auto Differential Once 04/29/2024   Comprehensive Metabolic Panel Once 04/29/2024   Lipid, Fasting Once 04/29/2024   TSH with Reflex Once 04/29/2024   Vitamin D 25 Hydroxy Once 04/29/2024   Vitamin B12 & Folate Once 10/10/2024               Patient Active Problem List:     Primary hypertension     Hypercholesteremia     Generalized anxiety disorder     Moderate episode of recurrent major depressive disorder (HCC)     Gastroesophageal reflux disease     Migraine     Sleeping difficulty

## 2025-02-25 ENCOUNTER — PATIENT MESSAGE (OUTPATIENT)
Dept: FAMILY MEDICINE CLINIC | Age: 75
End: 2025-02-25

## 2025-02-25 DIAGNOSIS — G43.809 OTHER MIGRAINE WITHOUT STATUS MIGRAINOSUS, NOT INTRACTABLE: ICD-10-CM

## 2025-02-26 NOTE — TELEPHONE ENCOUNTER
Last visit: 12/17/2024 Dr Davis  Last Med refill: 12/31/2024  Does patient have enough medication for 72 hours: Yes    Next Visit Date:  No future appointments.    Health Maintenance   Topic Date Due    Lipids  Never done    Hepatitis C screen  Never done    DTaP/Tdap/Td vaccine (1 - Tdap) Never done    Shingles vaccine (1 of 2) Never done    Pneumococcal 50+ years Vaccine (1 of 1 - PCV) Never done    DEXA (modify frequency per FRAX score)  Never done    Depression Monitoring  04/29/2025    Breast cancer screen  06/18/2025    Colorectal Cancer Screen  07/02/2034    Flu vaccine  Completed    COVID-19 Vaccine  Completed    Respiratory Syncytial Virus (RSV) Pregnant or age 60 yrs+  Completed    Hepatitis A vaccine  Aged Out    Hepatitis B vaccine  Aged Out    Hib vaccine  Aged Out    Polio vaccine  Aged Out    Meningococcal (ACWY) vaccine  Aged Out    Depression Screen  Discontinued       No results found for: \"LABA1C\"          ( goal A1C is < 7)   No components found for: \"LABMICR\"  No components found for: \"LDLCHOLESTEROL\", \"LDLCALC\"    (goal LDL is <100)   No results found for: \"AST\", \"ALT\", \"BUN\", \"CR\"  BP Readings from Last 3 Encounters:   12/17/24 132/80   10/10/24 134/85   04/29/24 122/78          (goal 120/80)    All Future Testing planned in [facility]  Lab Frequency Next Occurrence   COLONOSCOPY (Screening) Once 04/29/2024   CBC with Auto Differential Once 04/29/2024   Comprehensive Metabolic Panel Once 04/29/2024   Lipid, Fasting Once 04/29/2024   TSH with Reflex Once 04/29/2024   Vitamin D 25 Hydroxy Once 04/29/2024   Vitamin B12 & Folate Once 10/10/2024               Patient Active Problem List:     Primary hypertension     Hypercholesteremia     Generalized anxiety disorder     Moderate episode of recurrent major depressive disorder (HCC)     Gastroesophageal reflux disease     Migraine     Sleeping difficulty

## 2025-02-27 RX ORDER — BUTALBITAL, ACETAMINOPHEN AND CAFFEINE 50; 325; 40 MG/1; MG/1; MG/1
1 TABLET ORAL EVERY 4 HOURS PRN
Qty: 60 TABLET | Refills: 1 | Status: SHIPPED | OUTPATIENT
Start: 2025-02-27

## 2025-04-08 ENCOUNTER — HOSPITAL ENCOUNTER (OUTPATIENT)
Age: 75
Setting detail: SPECIMEN
Discharge: HOME | End: 2025-04-08

## 2025-04-08 DIAGNOSIS — I10 PRIMARY HYPERTENSION: ICD-10-CM

## 2025-04-08 DIAGNOSIS — E78.00 HYPERCHOLESTEREMIA: ICD-10-CM

## 2025-04-08 DIAGNOSIS — Z13.0 SCREENING, ANEMIA, DEFICIENCY, IRON: ICD-10-CM

## 2025-04-08 DIAGNOSIS — G60.0 CMT (CHARCOT-MARIE-TOOTH DISEASE): ICD-10-CM

## 2025-04-08 DIAGNOSIS — E55.9 VITAMIN D DEFICIENCY: ICD-10-CM

## 2025-04-08 DIAGNOSIS — Z13.29 SCREENING FOR THYROID DISORDER: ICD-10-CM

## 2025-04-08 LAB
25(OH)D3 SERPL-MCNC: 39.3 NG/ML (ref 30–100)
ALBUMIN SERPL-MCNC: 4.1 G/DL (ref 3.5–5.2)
ALBUMIN SERPL-MCNC: 4.2 G/DL (ref 3.5–5.2)
ALBUMIN/GLOB SERPL: 1.6 {RATIO} (ref 1–2.5)
ALBUMIN/GLOB SERPL: 1.8 {RATIO} (ref 1–2.5)
ALP SERPL-CCNC: 89 U/L (ref 35–104)
ALP SERPL-CCNC: 92 U/L (ref 35–104)
ALT SERPL-CCNC: 13 U/L (ref 10–35)
ALT SERPL-CCNC: 13 U/L (ref 10–35)
ANION GAP SERPL CALCULATED.3IONS-SCNC: 11 MMOL/L (ref 9–16)
ANION GAP SERPL CALCULATED.3IONS-SCNC: 12 MMOL/L (ref 9–16)
AST SERPL-CCNC: 21 U/L (ref 10–35)
AST SERPL-CCNC: 21 U/L (ref 10–35)
BASOPHILS # BLD: 0.06 K/UL (ref 0–0.2)
BASOPHILS # BLD: 0.06 K/UL (ref 0–0.2)
BASOPHILS NFR BLD: 1 % (ref 0–2)
BASOPHILS NFR BLD: 1 % (ref 0–2)
BILIRUB SERPL-MCNC: 0.3 MG/DL (ref 0–1.2)
BILIRUB SERPL-MCNC: 0.3 MG/DL (ref 0–1.2)
BUN SERPL-MCNC: 13 MG/DL (ref 8–23)
BUN SERPL-MCNC: 13 MG/DL (ref 8–23)
CALCIUM SERPL-MCNC: 8.9 MG/DL (ref 8.6–10.4)
CALCIUM SERPL-MCNC: 9.3 MG/DL (ref 8.6–10.4)
CHLORIDE SERPL-SCNC: 103 MMOL/L (ref 98–107)
CHLORIDE SERPL-SCNC: 104 MMOL/L (ref 98–107)
CHOLEST SERPL-MCNC: 146 MG/DL (ref 0–199)
CHOLEST SERPL-MCNC: 152 MG/DL (ref 0–199)
CHOLESTEROL/HDL RATIO: 2.4
CHOLESTEROL/HDL RATIO: 2.6
CK SERPL-CCNC: 62 U/L (ref 26–192)
CO2 SERPL-SCNC: 26 MMOL/L (ref 20–31)
CO2 SERPL-SCNC: 27 MMOL/L (ref 20–31)
CREAT SERPL-MCNC: 0.8 MG/DL (ref 0.6–0.9)
CREAT SERPL-MCNC: 0.8 MG/DL (ref 0.6–0.9)
EOSINOPHIL # BLD: 0.41 K/UL (ref 0–0.44)
EOSINOPHIL # BLD: 0.41 K/UL (ref 0–0.44)
EOSINOPHILS RELATIVE PERCENT: 5 % (ref 1–4)
EOSINOPHILS RELATIVE PERCENT: 5 % (ref 1–4)
ERYTHROCYTE [DISTWIDTH] IN BLOOD BY AUTOMATED COUNT: 13.4 % (ref 11.8–14.4)
ERYTHROCYTE [DISTWIDTH] IN BLOOD BY AUTOMATED COUNT: 13.4 % (ref 11.8–14.4)
FOLATE SERPL-MCNC: 19.6 NG/ML (ref 4.8–24.2)
GFR, ESTIMATED: 78 ML/MIN/1.73M2
GFR, ESTIMATED: 78 ML/MIN/1.73M2
GLUCOSE SERPL-MCNC: 84 MG/DL (ref 74–99)
GLUCOSE SERPL-MCNC: 87 MG/DL (ref 74–99)
HCT VFR BLD AUTO: 42.4 % (ref 36.3–47.1)
HCT VFR BLD AUTO: 42.4 % (ref 36.3–47.1)
HDLC SERPL-MCNC: 59 MG/DL
HDLC SERPL-MCNC: 61 MG/DL
HGB BLD-MCNC: 14.1 G/DL (ref 11.9–15.1)
HGB BLD-MCNC: 14.1 G/DL (ref 11.9–15.1)
IMM GRANULOCYTES # BLD AUTO: <0.03 K/UL (ref 0–0.3)
IMM GRANULOCYTES # BLD AUTO: <0.03 K/UL (ref 0–0.3)
IMM GRANULOCYTES NFR BLD: 0 %
IMM GRANULOCYTES NFR BLD: 0 %
LDLC SERPL CALC-MCNC: 66 MG/DL (ref 0–100)
LDLC SERPL CALC-MCNC: 75 MG/DL (ref 0–100)
LYMPHOCYTES NFR BLD: 1.92 K/UL (ref 1.1–3.7)
LYMPHOCYTES NFR BLD: 1.92 K/UL (ref 1.1–3.7)
LYMPHOCYTES RELATIVE PERCENT: 22 % (ref 24–43)
LYMPHOCYTES RELATIVE PERCENT: 22 % (ref 24–43)
MCH RBC QN AUTO: 29.5 PG (ref 25.2–33.5)
MCH RBC QN AUTO: 29.5 PG (ref 25.2–33.5)
MCHC RBC AUTO-ENTMCNC: 33.3 G/DL (ref 28.4–34.8)
MCHC RBC AUTO-ENTMCNC: 33.3 G/DL (ref 28.4–34.8)
MCV RBC AUTO: 88.7 FL (ref 82.6–102.9)
MCV RBC AUTO: 88.7 FL (ref 82.6–102.9)
MONOCYTES NFR BLD: 0.78 K/UL (ref 0.1–1.2)
MONOCYTES NFR BLD: 0.78 K/UL (ref 0.1–1.2)
MONOCYTES NFR BLD: 9 % (ref 3–12)
MONOCYTES NFR BLD: 9 % (ref 3–12)
NEUTROPHILS NFR BLD: 63 % (ref 36–65)
NEUTROPHILS NFR BLD: 63 % (ref 36–65)
NEUTS SEG NFR BLD: 5.4 K/UL (ref 1.5–8.1)
NEUTS SEG NFR BLD: 5.4 K/UL (ref 1.5–8.1)
NRBC BLD-RTO: 0 PER 100 WBC
NRBC BLD-RTO: 0 PER 100 WBC
PLATELET # BLD AUTO: 240 K/UL (ref 138–453)
PLATELET # BLD AUTO: 240 K/UL (ref 138–453)
PMV BLD AUTO: 10.3 FL (ref 8.1–13.5)
PMV BLD AUTO: 10.3 FL (ref 8.1–13.5)
POTASSIUM SERPL-SCNC: 4 MMOL/L (ref 3.7–5.3)
POTASSIUM SERPL-SCNC: 4.4 MMOL/L (ref 3.7–5.3)
PROT SERPL-MCNC: 6.5 G/DL (ref 6.6–8.7)
PROT SERPL-MCNC: 6.6 G/DL (ref 6.6–8.7)
RBC # BLD AUTO: 4.78 M/UL (ref 3.95–5.11)
RBC # BLD AUTO: 4.78 M/UL (ref 3.95–5.11)
SODIUM SERPL-SCNC: 141 MMOL/L (ref 136–145)
SODIUM SERPL-SCNC: 142 MMOL/L (ref 136–145)
TRIGL SERPL-MCNC: 92 MG/DL (ref 0–149)
TRIGL SERPL-MCNC: 93 MG/DL (ref 0–149)
TSH SERPL DL<=0.05 MIU/L-ACNC: 3.4 UIU/ML (ref 0.27–4.2)
VIT B12 SERPL-MCNC: 588 PG/ML (ref 232–1245)
VLDLC SERPL CALC-MCNC: 18 MG/DL (ref 1–30)
VLDLC SERPL CALC-MCNC: 19 MG/DL (ref 1–30)
WBC OTHER # BLD: 8.6 K/UL (ref 3.5–11.3)
WBC OTHER # BLD: 8.6 K/UL (ref 3.5–11.3)

## 2025-04-09 ENCOUNTER — RESULTS FOLLOW-UP (OUTPATIENT)
Dept: NEUROLOGY | Age: 75
End: 2025-04-09

## 2025-04-17 NOTE — TELEPHONE ENCOUNTER
Last visit: 12/17/2024   Last Med refill: 4/4/25   Does patient have enough medication for 72 hours: unknown, electronic request    Next Visit Date:  No future appointments.    Health Maintenance   Topic Date Due    Hepatitis C screen  Never done    DTaP/Tdap/Td vaccine (1 - Tdap) Never done    Shingles vaccine (1 of 2) Never done    Pneumococcal 50+ years Vaccine (1 of 1 - PCV) Never done    DEXA (modify frequency per FRAX score)  Never done    COVID-19 Vaccine (7 - 2024-25 season) 03/25/2025    Depression Monitoring  04/29/2025    Breast cancer screen  06/18/2025    Lipids  04/08/2026    Colorectal Cancer Screen  07/02/2034    Flu vaccine  Completed    Respiratory Syncytial Virus (RSV) Pregnant or age 60 yrs+  Completed    Hepatitis A vaccine  Aged Out    Hepatitis B vaccine  Aged Out    Hib vaccine  Aged Out    Polio vaccine  Aged Out    Meningococcal (ACWY) vaccine  Aged Out    Meningococcal B vaccine  Aged Out    Depression Screen  Discontinued       No results found for: \"LABA1C\"          ( goal A1C is < 7)   No components found for: \"LABMICR\"  No components found for: \"LDLCHOLESTEROL\", \"LDLCALC\"    (goal LDL is <100)   AST (U/L)   Date Value   04/08/2025 21   04/08/2025 21     ALT (U/L)   Date Value   04/08/2025 13   04/08/2025 13     BUN (mg/dL)   Date Value   04/08/2025 13   04/08/2025 13     BP Readings from Last 3 Encounters:   12/17/24 132/80   10/10/24 134/85   04/29/24 122/78          (goal 120/80)    All Future Testing planned in [facility]  Lab Frequency Next Occurrence   COLONOSCOPY (Screening) Once 04/29/2024               Patient Active Problem List:     Primary hypertension     Hypercholesteremia     Generalized anxiety disorder     Moderate episode of recurrent major depressive disorder (HCC)     Gastroesophageal reflux disease     Migraine     Sleeping difficulty         Please advise

## 2025-04-22 ENCOUNTER — RESULTS FOLLOW-UP (OUTPATIENT)
Dept: FAMILY MEDICINE CLINIC | Age: 75
End: 2025-04-22

## 2025-04-22 NOTE — RESULT ENCOUNTER NOTE
Notified via [employer] -     Your lab results were stable, we will repeat them next year or as needed.   Please call the office with any questions.     - Dr. LOMBARDO

## 2025-04-29 DIAGNOSIS — G43.809 OTHER MIGRAINE WITHOUT STATUS MIGRAINOSUS, NOT INTRACTABLE: ICD-10-CM

## 2025-04-29 NOTE — TELEPHONE ENCOUNTER
[facility] I left a message to confirm with Deandra that she is currently using this medication.   Per [facility] the last time filled & picked up was 01/20/25.  The max supply is 16 tabs per 30 days.

## 2025-05-02 NOTE — TELEPHONE ENCOUNTER
Patient called back and states she is currently using this medication. State she has not picked it up recently though.

## 2025-05-06 RX ORDER — RIMEGEPANT SULFATE 75 MG/75MG
75 TABLET, ORALLY DISINTEGRATING ORAL DAILY PRN
Qty: 16 TABLET | Refills: 3 | Status: SHIPPED | OUTPATIENT
Start: 2025-05-06

## 2025-05-06 NOTE — TELEPHONE ENCOUNTER
Last visit: 12/17/24  Last Med refill: 04/21/25  Does patient have enough medication for 72 hours: Yes    Next Visit Date:  No future appointments.    Health Maintenance   Topic Date Due    Hepatitis C screen  Never done    DTaP/Tdap/Td vaccine (1 - Tdap) Never done    Shingles vaccine (1 of 2) Never done    Pneumococcal 50+ years Vaccine (1 of 1 - PCV) Never done    DEXA (modify frequency per FRAX score)  Never done    COVID-19 Vaccine (7 - 2024-25 season) 03/25/2025    Depression Monitoring  04/29/2025    Breast cancer screen  06/18/2025    Lipids  04/08/2026    Colorectal Cancer Screen  07/02/2034    Flu vaccine  Completed    Respiratory Syncytial Virus (RSV) Pregnant or age 60 yrs+  Completed    Hepatitis A vaccine  Aged Out    Hepatitis B vaccine  Aged Out    Hib vaccine  Aged Out    Polio vaccine  Aged Out    Meningococcal (ACWY) vaccine  Aged Out    Meningococcal B vaccine  Aged Out    Depression Screen  Discontinued       No results found for: \"LABA1C\"          ( goal A1C is < 7)   No components found for: \"LABMICR\"  No components found for: \"LDLCHOLESTEROL\", \"LDLCALC\"    (goal LDL is <100)   AST (U/L)   Date Value   04/08/2025 21   04/08/2025 21     ALT (U/L)   Date Value   04/08/2025 13   04/08/2025 13     BUN (mg/dL)   Date Value   04/08/2025 13   04/08/2025 13     BP Readings from Last 3 Encounters:   12/17/24 132/80   10/10/24 134/85   04/29/24 122/78          (goal 120/80)    All Future Testing planned in [facility]  Lab Frequency Next Occurrence               Patient Active Problem List:     Primary hypertension     Hypercholesteremia     Generalized anxiety disorder     Moderate episode of recurrent major depressive disorder (HCC)     Gastroesophageal reflux disease     Migraine     Sleeping difficulty

## 2025-06-17 DIAGNOSIS — S89.92XA LEFT KNEE INJURY, INITIAL ENCOUNTER: ICD-10-CM

## 2025-06-18 RX ORDER — CELECOXIB 100 MG/1
100 CAPSULE ORAL DAILY
Qty: 30 CAPSULE | Refills: 0 | Status: SHIPPED | OUTPATIENT
Start: 2025-06-18

## 2025-06-18 NOTE — TELEPHONE ENCOUNTER
Last visit: 12/17/2024  Last Med refill: 01/20/2025  Does patient have enough medication for 72 hours: No    Called patient to schedule appointment, left her a voicemail to call the office to schedule    Next Visit Date:  No future appointments.    Health Maintenance   Topic Date Due    Hepatitis C screen  Never done    DTaP/Tdap/Td vaccine (1 - Tdap) Never done    Shingles vaccine (1 of 2) Never done    Pneumococcal 50+ years Vaccine (1 of 1 - PCV) Never done    DEXA (modify frequency per FRAX score)  Never done    COVID-19 Vaccine (7 - 2024-25 season) 03/25/2025    Depression Monitoring  04/29/2025    Breast cancer screen  06/18/2025    Lipids  04/08/2026    Colorectal Cancer Screen  07/02/2034    Flu vaccine  Completed    Respiratory Syncytial Virus (RSV) Pregnant or age 60 yrs+  Completed    Hepatitis A vaccine  Aged Out    Hepatitis B vaccine  Aged Out    Hib vaccine  Aged Out    Polio vaccine  Aged Out    Meningococcal (ACWY) vaccine  Aged Out    Meningococcal B vaccine  Aged Out    Depression Screen  Discontinued       No results found for: \"LABA1C\"          ( goal A1C is < 7)   No components found for: \"LABMICR\"  No components found for: \"LDLCHOLESTEROL\", \"LDLCALC\"    (goal LDL is <100)   AST (U/L)   Date Value   04/08/2025 21   04/08/2025 21     ALT (U/L)   Date Value   04/08/2025 13   04/08/2025 13     BUN (mg/dL)   Date Value   04/08/2025 13   04/08/2025 13     BP Readings from Last 3 Encounters:   12/17/24 132/80   10/10/24 134/85   04/29/24 122/78          (goal 120/80)    All Future Testing planned in [facility]  Lab Frequency Next Occurrence               Patient Active Problem List:     Primary hypertension     Hypercholesteremia     Generalized anxiety disorder     Moderate episode of recurrent major depressive disorder (HCC)     Gastroesophageal reflux disease     Migraine     Sleeping difficulty         ,  
22-Jan-2022 11:52

## 2025-07-01 DIAGNOSIS — G43.809 OTHER MIGRAINE WITHOUT STATUS MIGRAINOSUS, NOT INTRACTABLE: ICD-10-CM

## 2025-07-01 NOTE — TELEPHONE ENCOUNTER
Last visit: 12/17/24   w/Dr Davis  Last Med refill:   Does patient have enough medication for 72 hours: No: 2 tabs left    Next Visit Date:  07/09/25  Future Appointments   Date Time Provider Department Center   7/9/2025  1:15 PM Sunshine Perkins MD [facility] DE[facility]       Health Maintenance   Topic Date Due    Hepatitis C screen  Never done    DTaP/Tdap/Td vaccine (1 - Tdap) Never done    Shingles vaccine (1 of 2) Never done    Pneumococcal 50+ years Vaccine (1 of 1 - PCV) Never done    DEXA (modify frequency per FRAX score)  Never done    COVID-19 Vaccine (7 - 2024-25 season) 03/25/2025    Depression Monitoring  04/29/2025    Breast cancer screen  06/18/2025    Flu vaccine (1) 08/01/2025    Lipids  04/08/2026    Colorectal Cancer Screen  07/02/2034    Respiratory Syncytial Virus (RSV) Pregnant or age 60 yrs+  Completed    Hepatitis A vaccine  Aged Out    Hepatitis B vaccine  Aged Out    Hib vaccine  Aged Out    Polio vaccine  Aged Out    Meningococcal (ACWY) vaccine  Aged Out    Meningococcal B vaccine  Aged Out    Depression Screen  Discontinued       No results found for: \"LABA1C\"          ( goal A1C is < 7)   No components found for: \"LABMICR\"  No components found for: \"LDLCHOLESTEROL\", \"LDLCALC\"    (goal LDL is <100)   AST (U/L)   Date Value   04/08/2025 21   04/08/2025 21     ALT (U/L)   Date Value   04/08/2025 13   04/08/2025 13     BUN (mg/dL)   Date Value   04/08/2025 13   04/08/2025 13     BP Readings from Last 3 Encounters:   12/17/24 132/80   10/10/24 134/85   04/29/24 122/78          (goal 120/80)    All Future Testing planned in [facility]  Lab Frequency Next Occurrence               Patient Active Problem List:     Primary hypertension     Hypercholesteremia     Generalized anxiety disorder     Moderate episode of recurrent major depressive disorder (HCC)     Gastroesophageal reflux disease     Migraine     Sleeping difficulty

## 2025-07-02 DIAGNOSIS — F33.1 MODERATE EPISODE OF RECURRENT MAJOR DEPRESSIVE DISORDER (HCC): ICD-10-CM

## 2025-07-02 DIAGNOSIS — F41.1 GENERALIZED ANXIETY DISORDER: ICD-10-CM

## 2025-07-02 RX ORDER — BUTALBITAL, ACETAMINOPHEN AND CAFFEINE 50; 325; 40 MG/1; MG/1; MG/1
1 TABLET ORAL EVERY 4 HOURS PRN
Qty: 60 TABLET | Refills: 1 | Status: SHIPPED | OUTPATIENT
Start: 2025-07-02

## 2025-07-02 RX ORDER — SERTRALINE HYDROCHLORIDE 100 MG/1
100 TABLET, FILM COATED ORAL DAILY
Qty: 90 TABLET | Refills: 0 | Status: SHIPPED | OUTPATIENT
Start: 2025-07-02

## 2025-07-02 NOTE — TELEPHONE ENCOUNTER
Last visit: 12/17/2024  Last Med refill: 06/04/2025  Does patient have enough medication for 72 hours: Yes    Next Visit Date:  Future Appointments   Date Time Provider Department Center   7/9/2025  1:15 PM Sunshine Perkins MD [facility] DE[facility]       Health Maintenance   Topic Date Due    Hepatitis C screen  Never done    DTaP/Tdap/Td vaccine (1 - Tdap) Never done    Shingles vaccine (1 of 2) Never done    Pneumococcal 50+ years Vaccine (1 of 1 - PCV) Never done    DEXA (modify frequency per FRAX score)  Never done    COVID-19 Vaccine (7 - 2024-25 season) 03/25/2025    Depression Monitoring  04/29/2025    Breast cancer screen  06/18/2025    Flu vaccine (1) 08/01/2025    Lipids  04/08/2026    Colorectal Cancer Screen  07/02/2034    Respiratory Syncytial Virus (RSV) Pregnant or age 60 yrs+  Completed    Hepatitis A vaccine  Aged Out    Hepatitis B vaccine  Aged Out    Hib vaccine  Aged Out    Polio vaccine  Aged Out    Meningococcal (ACWY) vaccine  Aged Out    Meningococcal B vaccine  Aged Out    Depression Screen  Discontinued       No results found for: \"LABA1C\"          ( goal A1C is < 7)   No components found for: \"LABMICR\"  No components found for: \"LDLCHOLESTEROL\", \"LDLCALC\"    (goal LDL is <100)   AST (U/L)   Date Value   04/08/2025 21   04/08/2025 21     ALT (U/L)   Date Value   04/08/2025 13   04/08/2025 13     BUN (mg/dL)   Date Value   04/08/2025 13   04/08/2025 13     BP Readings from Last 3 Encounters:   12/17/24 132/80   10/10/24 134/85   04/29/24 122/78          (goal 120/80)    All Future Testing planned in [facility]  Lab Frequency Next Occurrence               Patient Active Problem List:     Primary hypertension     Hypercholesteremia     Generalized anxiety disorder     Moderate episode of recurrent major depressive disorder (HCC)     Gastroesophageal reflux disease     Migraine     Sleeping difficulty

## 2025-07-09 ENCOUNTER — OFFICE VISIT (OUTPATIENT)
Dept: FAMILY MEDICINE CLINIC | Age: 75
End: 2025-07-09
Payer: COMMERCIAL

## 2025-07-09 VITALS
HEART RATE: 75 BPM | WEIGHT: 188.6 LBS | SYSTOLIC BLOOD PRESSURE: 120 MMHG | DIASTOLIC BLOOD PRESSURE: 72 MMHG | BODY MASS INDEX: 32.37 KG/M2 | OXYGEN SATURATION: 94 %

## 2025-07-09 DIAGNOSIS — S89.92XA LEFT KNEE INJURY, INITIAL ENCOUNTER: ICD-10-CM

## 2025-07-09 DIAGNOSIS — F33.1 MODERATE EPISODE OF RECURRENT MAJOR DEPRESSIVE DISORDER (HCC): ICD-10-CM

## 2025-07-09 DIAGNOSIS — Z12.31 ENCOUNTER FOR SCREENING MAMMOGRAM FOR MALIGNANT NEOPLASM OF BREAST: ICD-10-CM

## 2025-07-09 DIAGNOSIS — E78.00 HYPERCHOLESTEREMIA: ICD-10-CM

## 2025-07-09 DIAGNOSIS — L30.1 DYSHIDROTIC ECZEMA: ICD-10-CM

## 2025-07-09 DIAGNOSIS — Z91.81 AT HIGH RISK FOR FALLS: ICD-10-CM

## 2025-07-09 DIAGNOSIS — K21.9 GASTROESOPHAGEAL REFLUX DISEASE, UNSPECIFIED WHETHER ESOPHAGITIS PRESENT: ICD-10-CM

## 2025-07-09 DIAGNOSIS — G43.809 OTHER MIGRAINE WITHOUT STATUS MIGRAINOSUS, NOT INTRACTABLE: Primary | ICD-10-CM

## 2025-07-09 DIAGNOSIS — I10 PRIMARY HYPERTENSION: ICD-10-CM

## 2025-07-09 PROCEDURE — 3074F SYST BP LT 130 MM HG: CPT | Performed by: INTERNAL MEDICINE

## 2025-07-09 PROCEDURE — 3078F DIAST BP <80 MM HG: CPT | Performed by: INTERNAL MEDICINE

## 2025-07-09 PROCEDURE — 1123F ACP DISCUSS/DSCN MKR DOCD: CPT | Performed by: INTERNAL MEDICINE

## 2025-07-09 PROCEDURE — 99215 OFFICE O/P EST HI 40 MIN: CPT | Performed by: INTERNAL MEDICINE

## 2025-07-09 RX ORDER — BUTALBITAL, ACETAMINOPHEN AND CAFFEINE 50; 325; 40 MG/1; MG/1; MG/1
1 TABLET ORAL EVERY 4 HOURS PRN
Qty: 60 TABLET | Refills: 1 | Status: SHIPPED | OUTPATIENT
Start: 2025-07-09

## 2025-07-09 RX ORDER — CELECOXIB 100 MG/1
100 CAPSULE ORAL DAILY
Qty: 90 CAPSULE | Refills: 1 | Status: SHIPPED | OUTPATIENT
Start: 2025-07-09

## 2025-07-09 RX ORDER — METOPROLOL TARTRATE 50 MG
50 TABLET ORAL 2 TIMES DAILY
Qty: 180 TABLET | Refills: 1 | Status: CANCELLED | OUTPATIENT
Start: 2025-07-09

## 2025-07-09 RX ORDER — METOPROLOL TARTRATE 50 MG
50 TABLET ORAL 2 TIMES DAILY
Qty: 180 TABLET | Refills: 1 | Status: SHIPPED | OUTPATIENT
Start: 2025-07-09

## 2025-07-09 RX ORDER — ROSUVASTATIN CALCIUM 5 MG/1
5 TABLET, COATED ORAL DAILY
Qty: 90 TABLET | Refills: 1 | Status: SHIPPED | OUTPATIENT
Start: 2025-07-09

## 2025-07-09 RX ORDER — OMEPRAZOLE 40 MG/1
40 CAPSULE, DELAYED RELEASE ORAL DAILY
Qty: 90 CAPSULE | Refills: 1 | Status: SHIPPED | OUTPATIENT
Start: 2025-07-09

## 2025-07-09 RX ORDER — TRIAMCINOLONE ACETONIDE 0.25 MG/G
OINTMENT TOPICAL
Qty: 15 G | Refills: 1 | Status: SHIPPED | OUTPATIENT
Start: 2025-07-09 | End: 2025-07-16

## 2025-07-09 SDOH — ECONOMIC STABILITY: FOOD INSECURITY: WITHIN THE PAST 12 MONTHS, THE FOOD YOU BOUGHT JUST DIDN'T LAST AND YOU DIDN'T HAVE MONEY TO GET MORE.: NEVER TRUE

## 2025-07-09 SDOH — ECONOMIC STABILITY: FOOD INSECURITY: WITHIN THE PAST 12 MONTHS, YOU WORRIED THAT YOUR FOOD WOULD RUN OUT BEFORE YOU GOT MONEY TO BUY MORE.: NEVER TRUE

## 2025-07-09 ASSESSMENT — PATIENT HEALTH QUESTIONNAIRE - PHQ9
8. MOVING OR SPEAKING SO SLOWLY THAT OTHER PEOPLE COULD HAVE NOTICED. OR THE OPPOSITE, BEING SO FIGETY OR RESTLESS THAT YOU HAVE BEEN MOVING AROUND A LOT MORE THAN USUAL: NOT AT ALL
7. TROUBLE CONCENTRATING ON THINGS, SUCH AS READING THE NEWSPAPER OR WATCHING TELEVISION: NOT AT ALL
SUM OF ALL RESPONSES TO PHQ QUESTIONS 1-9: 0
3. TROUBLE FALLING OR STAYING ASLEEP: NOT AT ALL
SUM OF ALL RESPONSES TO PHQ QUESTIONS 1-9: 0
6. FEELING BAD ABOUT YOURSELF - OR THAT YOU ARE A FAILURE OR HAVE LET YOURSELF OR YOUR FAMILY DOWN: NOT AT ALL
1. LITTLE INTEREST OR PLEASURE IN DOING THINGS: NOT AT ALL
9. THOUGHTS THAT YOU WOULD BE BETTER OFF DEAD, OR OF HURTING YOURSELF: NOT AT ALL
SUM OF ALL RESPONSES TO PHQ QUESTIONS 1-9: 0
4. FEELING TIRED OR HAVING LITTLE ENERGY: NOT AT ALL
10. IF YOU CHECKED OFF ANY PROBLEMS, HOW DIFFICULT HAVE THESE PROBLEMS MADE IT FOR YOU TO DO YOUR WORK, TAKE CARE OF THINGS AT HOME, OR GET ALONG WITH OTHER PEOPLE: NOT DIFFICULT AT ALL
5. POOR APPETITE OR OVEREATING: NOT AT ALL
2. FEELING DOWN, DEPRESSED OR HOPELESS: NOT AT ALL
SUM OF ALL RESPONSES TO PHQ QUESTIONS 1-9: 0

## 2025-07-09 NOTE — PATIENT INSTRUCTIONS
Neurology - [phone]   Find your EMG and gene test results - if unable to find them please discuss repeating them with neurology when you see them

## 2025-07-09 NOTE — PROGRESS NOTES
[facility]  [facility]  [address] [place] [id number]  Dept: [phone]  Dept Fax: [phone]      Deandra Lou is a 75 y.o. female who presents today for hermedical conditions/complaints as noted below.  Deandra Lou is c/o of Migraine (F/u), 3 Month Follow-Up, and Medication Check        Assessment/Plan:     1. Other migraine without status migrainosus, not intractable  Comments:  use nurtec first - use fioricet if migraine not resolving with second dose of nurtec, med agreement for fioricet in chart  Orders:  -     butalbital-acetaminophen-caffeine (FIORICET, ESGIC) -40 MG per tablet; Take 1 tablet by mouth every 4 hours as needed for Migraine, Disp-60 tablet, R-1Normal  2. Gastroesophageal reflux disease, unspecified whether esophagitis present  -     omeprazole (PRILOSEC) 40 MG delayed release capsule; Take 1 capsule by mouth daily, Disp-90 capsule, R-1Normal  3. Encounter for screening mammogram for malignant neoplasm of breast  -     PAT DIGITAL SCREEN W OR WO CAD BILATERAL; Future  4. Primary hypertension  -     metoprolol tartrate (LOPRESSOR) 50 MG tablet; Take 1 tablet by mouth 2 times daily, Disp-180 tablet, R-1Normal  5. Hypercholesteremia  -     rosuvastatin (CRESTOR) 5 MG tablet; Take 1 tablet by mouth daily, Disp-90 tablet, R-1Normal  6. Moderate episode of recurrent major depressive disorder (HCC)  7. At high risk for falls  8. Left knee injury, initial encounter  Comments:  exercise handout provided, ortho referral if not improving  Orders:  -     celecoxib (CELEBREX) 100 MG capsule; Take 1 capsule by mouth daily, Disp-90 capsule, R-1Normal  9. Dyshidrotic eczema  -     triamcinolone (KENALOG) 0.025 % ointment; Apply topically 2 times daily., Disp-15 g, R-1, Normal    On this date 7/9/2025 I have spent 40 minutes reviewing previous notes, test results and face to face with the patient discussing the diagnosis and importance of compliance with the

## 2025-07-17 ASSESSMENT — VISUAL ACUITY: OU: 1

## 2025-07-17 ASSESSMENT — ENCOUNTER SYMPTOMS
BLOOD IN STOOL: 0
COUGH: 0
WHEEZING: 0
ANAL BLEEDING: 0
NAUSEA: 0
CHEST TIGHTNESS: 0
CONSTIPATION: 0
DIARRHEA: 0
CHOKING: 0
SHORTNESS OF BREATH: 0
ABDOMINAL PAIN: 0
VOMITING: 0

## 2025-07-25 NOTE — TELEPHONE ENCOUNTER
Last visit: 7/9/2025  Last Med refill: 07.02.2025 for 30 days   Does patient have enough medication for 72 hours: Yes    Next Visit Date:  Future Appointments   Date Time Provider Department Center   1/12/2026  1:15 PM Sunshine Perkins MD [facility] DE[facility]       Health Maintenance   Topic Date Due    Hepatitis C screen  Never done    DTaP/Tdap/Td vaccine (1 - Tdap) Never done    Shingles vaccine (1 of 2) Never done    Pneumococcal 50+ years Vaccine (1 of 1 - PCV) Never done    DEXA (modify frequency per FRAX score)  Never done    COVID-19 Vaccine (7 - 2024-25 season) 03/25/2025    Breast cancer screen  06/18/2025    Flu vaccine (1) 08/01/2025    Lipids  04/08/2026    Depression Monitoring  07/09/2026    Colorectal Cancer Screen  07/02/2034    Respiratory Syncytial Virus (RSV) Pregnant or age 60 yrs+  Completed    Hepatitis A vaccine  Aged Out    Hepatitis B vaccine  Aged Out    Hib vaccine  Aged Out    Polio vaccine  Aged Out    Meningococcal (ACWY) vaccine  Aged Out    Meningococcal B vaccine  Aged Out    Depression Screen  Discontinued       No results found for: \"LABA1C\"          ( goal A1C is < 7)   No components found for: \"LABMICR\"  No components found for: \"LDLCHOLESTEROL\", \"LDLCALC\"    (goal LDL is <100)   AST (U/L)   Date Value   04/08/2025 21   04/08/2025 21     ALT (U/L)   Date Value   04/08/2025 13   04/08/2025 13     BUN (mg/dL)   Date Value   04/08/2025 13   04/08/2025 13     BP Readings from Last 3 Encounters:   07/09/25 120/72   12/17/24 132/80   10/10/24 134/85          (goal 120/80)    All Future Testing planned in [facility]  Lab Frequency Next Occurrence   PAT DIGITAL SCREEN W OR WO CAD BILATERAL Once 07/09/2025               Patient Active Problem List:     Primary hypertension     Hypercholesteremia     Generalized anxiety disorder     Moderate episode of recurrent major depressive disorder (HCC)     Gastroesophageal reflux disease     Migraine     Sleeping difficulty            The patient denies symptoms of hyper or hypoglycemia, paresthesias, foot ulcers, vision loss and depression. Compliant with diet, exercise and medications. DM eye exam and urine microalbumin are UTD. Glucolog reviewed and control appears adequate. Exam is unchanged. No DM foot ulcers. CPM including home monitoring, diet, exercise, weight loss and routine foot care. Target LDL <70, triglycerides <150 and HDL>40. Further evaluation, treatment, and follow-up per orders, current med list, and patient instructions.